# Patient Record
Sex: MALE | Race: BLACK OR AFRICAN AMERICAN
[De-identification: names, ages, dates, MRNs, and addresses within clinical notes are randomized per-mention and may not be internally consistent; named-entity substitution may affect disease eponyms.]

---

## 2020-01-08 ENCOUNTER — HOSPITAL ENCOUNTER (EMERGENCY)
Dept: HOSPITAL 62 - ER | Age: 40
Discharge: HOME | End: 2020-01-08
Payer: SELF-PAY

## 2020-01-08 VITALS — DIASTOLIC BLOOD PRESSURE: 89 MMHG | SYSTOLIC BLOOD PRESSURE: 129 MMHG

## 2020-01-08 DIAGNOSIS — S81.852A: Primary | ICD-10-CM
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
## 2020-01-08 DIAGNOSIS — J45.909: ICD-10-CM

## 2020-01-08 DIAGNOSIS — F17.210: ICD-10-CM

## 2020-01-08 PROCEDURE — 90376 RABIES IG HEAT TREATED: CPT

## 2020-01-08 PROCEDURE — 99406 BEHAV CHNG SMOKING 3-10 MIN: CPT

## 2020-01-08 PROCEDURE — 90675 RABIES VACCINE IM: CPT

## 2020-01-08 PROCEDURE — 90472 IMMUNIZATION ADMIN EACH ADD: CPT

## 2020-01-08 PROCEDURE — 90471 IMMUNIZATION ADMIN: CPT

## 2020-01-08 PROCEDURE — 90715 TDAP VACCINE 7 YRS/> IM: CPT

## 2020-01-08 PROCEDURE — 96372 THER/PROPH/DIAG INJ SC/IM: CPT

## 2020-01-08 PROCEDURE — 99283 EMERGENCY DEPT VISIT LOW MDM: CPT

## 2020-01-08 NOTE — ER DOCUMENT REPORT
ED Animal Bite





- General


Chief Complaint: Dog Bite


Stated Complaint: DOG BITE


Time Seen by Provider: 01/08/20 19:46


Primary Care Provider: 


[facility] [Provider Group] - Follow up as needed


[facility] [Provider Group] - Follow up as needed


[facility] [Provider Group] - Follow up as needed


[facility] [Provider Group] - Follow up as needed


Mode of Arrival: Ambulatory


Information source: Patient


Notes: 





39-year-old male presented to ED for complaint of dog bite to the right calf 3 

hours ago.  He states his tetanus is not up-to-date.  He states he knows the david

lives in his area but does not know his name.  He does not know the dog.  Does 

not know if the dog's immunizations are up-to-date.  He states the dog did look 

healthy.  He states he was shaken the man's hand when the dog nipped him on the 

calf.  It did break the skin.





- HPI


Location of injury: LLE


Severity of injury: Scratched


Onset: This afternoon


Quality of pain: Achy


Pain Level: 3


Severity: Moderate


Context of attack: "Unprovoked" attack, Approached animal


Type of animal: Dog


Appearance of animal: Appeared well


Animal's immunizations: Unknown


Animal control notified: Yes


Animal control form completed: Yes





- Related Data


Allergies/Adverse Reactions: 


                                        





No Known Allergies Allergy (Unverified 08/26/19 15:54)


   











Past Medical History





- General


Information source: Patient





- Social History


Smoking Status: Current Every Day Smoker


Cigarette use (# per day): Yes - One half pack a day


Chew tobacco use (# tins/day): No


Smoking Education Provided: Yes - 4 minutes


Frequency of alcohol use: Social


Drug Abuse: Marijuana


Occupation: Construction


Lives with: Homeless


Family History: None


Patient has suicidal ideation: No


Patient has homicidal ideation: No





- Past Medical History


Cardiac Medical History: Reports: None


Pulmonary Medical History: Reports: Hx Asthma


EENT Medical History: Reports: None


Neurological Medical History: Reports: None


Endocrine Medical History: Reports: None


Renal/ Medical History: Reports: None


Malignancy Medical History: Reports None


GI Medical History: Reports: None


Musculoskeletal Medical History: Reports Hx Musculoskeletal Trauma - Torn ACL 

fractured this finger on the right, torn tendons in the left fifth


Skin Medical History: Reports None


Psychiatric Medical History: Reports: None


Traumatic Medical History: Reports: None


Infectious Medical History: Reports: None


Past Surgical History: Reports: Hx Cardiac Surgery - when he was an infant





Review of Systems





- Review of Systems


Constitutional: No symptoms reported


EENT: No symptoms reported


Cardiovascular: No symptoms reported


Respiratory: No symptoms reported


Gastrointestinal: No symptoms reported


Genitourinary: No symptoms reported


Male Genitourinary: No symptoms reported


Musculoskeletal: No symptoms reported


Skin: Other - Dog bite right lower calf


Hematologic/Lymphatic: No symptoms reported


Neurological/Psychological: No symptoms reported





Physical Exam





- Vital signs


Vitals: 


                                        











Temp Pulse Resp BP Pulse Ox


 


 98.6 F   97   16   132/79 H  98 


 


 01/08/20 19:29  01/08/20 19:29  01/08/20 19:29  01/08/20 19:29  01/08/20 19:29











Interpretation: Normal





- General


General appearance: Appears well, Alert





- HEENT


Head: Normocephalic, Atraumatic


Eyes: Normal


Pupils: PERRL





- Respiratory


Respiratory status: No respiratory distress


Chest status: Nontender


Breath sounds: Normal


Chest palpation: Normal





- Cardiovascular


Rhythm: Regular


Heart sounds: Normal auscultation


Murmur: No





- Abdominal


Inspection: Normal


Distension: No distension


Bowel sounds: Normal


Tenderness: Nontender


Organomegaly: No organomegaly





- Back


Back: Normal, Nontender





- Extremities


General upper extremity: Normal inspection, Nontender, Normal color, Normal ROM,

Normal temperature


General lower extremity: Normal color, Normal ROM, Normal temperature, Normal 

weight bearing.  No: Gurpreet's sign


Calf: Tender, Laceration - Very minimal dog bite to the left lateral calf less 

than half centimeter





- Neurological


Neuro grossly intact: Yes


Cognition: Normal


Orientation: AAOx4


Chani Coma Scale Eye Opening: Spontaneous


Chani Coma Scale Verbal: Oriented


L[place]ty Coma Scale Motor: Obeys Commands


[place]ty Coma Scale Total: 15


Speech: Normal


Motor strength normal: LUE, RUE, LLE, RLE


Sensory: Normal





- Psychological


Associated symptoms: Normal affect, Normal mood





- Skin


Skin Temperature: Warm


Skin Moisture: Dry


Skin Color: Normal





Course





- Re-evaluation


Re-evalutation: 





01/08/20 21:14


Patient has elected to take the rabies shots.  He is also been treated with 

tetanus immunization and Augmentin.  He was discharged home with a prescription 

for Augmentin.  Patient has been instructed to return to the ED for nurse visits

for the rest of his rabies shots.  Patient verbalized understanding and 

agreement with treatment plan patient was discharged home.





- Vital Signs


Vital signs: 


                                        











Temp Pulse Resp BP Pulse Ox


 


 98.6 F   97   16   132/79 H  98 


 


 01/08/20 19:29  01/08/20 19:29  01/08/20 19:29  01/08/20 19:29  01/08/20 19:29














Discharge





- Discharge


Clinical Impression: 


Dog bite of calf


Qualifiers:


 Encounter type: initial encounter Laterality: left Qualified Code(s): S81.852A 

- Open bite, left lower leg, initial encounter





Condition: Stable


Disposition: HOME, SELF-CARE


Additional Instructions: 


Animal Bites





     Animal bites are often heavily contaminated with bacteria.  In spite of 

thorough cleansing and proper treatment, these wounds frequently become 

infected.  Bite wounds of the hands are especially prone to complications.


     Bites are dressed, if possible.  Large wounds may require suturing after 

internal cleansing.  Because of infection risk, some large wounds must remain 

unstitched.  Your doctor is trained to advise you on the best treatment for your

bite.


     Call the doctor at once if the wound becomes red, swollen, warm, 

increasingly painful, or if it begins to drain.  Danger signs also include red 

streaks up the involved extremity, swollen glands in the groin or under the arm,

or fever and chills.


     The risk of rabies from domestic animals is very low.  Bats, sick animals, 

and wild animals may expose you to rabies.  The physician, or the health 

department, will inform you if you will need to receive the rabies vaccine.








NON-SUTURED LACERATION:


     Your laceration did not require suturing.  Some lacerations cannot be s

utured because of increased infection risk, while others simply don't need 

stitches because they are shallow or very short.


     Your injury should be protected while it heals.  Usually complete healing 

takes 10 to 14 days.  Keep the dressing clean and dry, and change it every day.


     If you notice increasing pain, redness, swelling, drainage, or tender lumps

in the armpit or groin above the injury, infection may be present.  You should 

call the doctor at once.





SOAP CLEANSING:


     Gently wash the wound daily using a mild soap (like Ivory, Phisoderm, 

Neutrogena).  Use warm water, rubbing gently until all debris, ooze, and 

crusting have been washed from the wound.  Allow to dry briefly (about 10 

minutes) after cleaning.  Repeat this cleansing at least three times a day for 

the first two days and then once or twice a day.








ANTIBIOTIC OINTMENT PROTECTION:


     Your wounds are such that dressing them is not practical or optional.  

After cleansing, you should apply a thin coating of antibiotic ointment 

(Bacitracin, not Neosporin) to the wounds at least three times daily.  This 

lessens infection risk, and may decrease the amount of scarring.  Use a q-tip or

dull butter knife, not your finger, to apply this ointment.


     Any debris or ooze which builds up in the ointment should be gently rubbed 

off with a sterile gauze pad.  Harder crusting may need to be gently scrubbed 

off with a clean wash cloth with soap and warm water, perhaps applying a warm, 

wet wash cloth to the wound for ten minutes first.


     Development of redness, severe itching, or blistering may mean allergy to 

the ointment.  See the doctor.





Rabies Prophyllaxis





     Rabies immunization can prevent infection with the rabies virus. This virus

is always fatal if it reaches the nervous system. Exposure to an infected 

animal's saliva requires a series of shots. If you're already immunized, you may

need only a booster shot. 


     It's critical for you to follow the exact schedule of immunizations. After 

the first shot, we give repeat doses in 3 days, 7 days, 14 days, and 28 days. 

The repeat doses can also be given through the Health Department or by special 

arrangement with your doctor.


     Ibuprofen or acetaminophen can be used for aching and swelling at the 

injection site. Call the doctor or return if you develop increasing pain, fever,

chills, or spreading redness, or if you become short of breath or faint.





TETANUS IMMUNIZATION GIVEN:


     You have been given an immunization against tetanus.  Please record this in

your records.  In general, a booster is needed only once every 10 years.  The 

tetanus shot protects against tetanus or "lockjaw," which is a complication of 

certain wound infections (the tetanus shot cannot protect against the actual 

infection).


     The immunization site may become warm and red due to local reaction.  If 

this occurs, apply warm compresses and take aspirin or ibuprofen to reduce 

inflammation and discomfort.  Return for evaluation if the reaction becomes 

severe.





Augmentin





     Augmentin is a mixture of amoxicillin and clavulanate. Amoxicillin is a 

member of the penicillin family.  It covers the germs likely to cause ear, 

bronchial, and urinary infections better than plain penicillin.  The addition of

clavulanate allows it to cover staph infections of the skin, as well as 

resistant cases of ear and sinus infections.  Your physician has chosen 

Augmentin for you because of the special nature of your situation.


     Augmentin is best taken with meals.  Nausea after taking the medication is 

rare, but can occur.  Diarrhea can occur, particularly in small children.  

Vaginal yeast infections, and oral thrush in infants are also common.  Contact 

your physician if these problems occur.


     Allergy to penicillins is common.  If you have had an allergic reaction to 

any drug of the penicillin family, you should never take any other penicillin.  

Notify your doctor at once if you develop hives, shortness of breath, swelling, 

or faintness.





FOLLOW-UP CARE:


     Please return in ___3__ days for an infection check and dressing change.





     If you have been referred to another physician for follow-up care, call 

that physicians office for an appointment as you were instructed.  If you 

experience a significant change in your laceration, or if you are concerned 

there may be an infection (swelling, redness, drainage, increasing tenderness, 

red streaks, tender lumps in the armpit or groin above the laceration, or 

fever), return to the Emergency Department immediately re-evaluation.








Prescriptions: 


Amox Tr/Potassium Clavulanate [Augmentin 875-125 Tablet] 1 tab PO BID 10 Days  

tablet


Forms:  Elevated Blood Pressure, Smoking Cessation Education


Referrals: 


[facility] [Provider Group] - Follow up as needed


[facility] [Provider Group] - Follow up as needed


[facility] [Provider Group] - Follow up as needed


[facility] [Provider Group] - Follow up as needed

## 2020-01-10 ENCOUNTER — HOSPITAL ENCOUNTER (EMERGENCY)
Dept: HOSPITAL 62 - ER | Age: 40
LOS: 1 days | Discharge: HOME | End: 2020-01-11
Payer: SELF-PAY
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
## 2020-01-10 DIAGNOSIS — M25.562: Primary | ICD-10-CM

## 2020-01-10 DIAGNOSIS — F17.200: ICD-10-CM

## 2020-01-10 DIAGNOSIS — S86.811S: ICD-10-CM

## 2020-01-10 DIAGNOSIS — F12.10: ICD-10-CM

## 2020-01-10 DIAGNOSIS — Z59.0: ICD-10-CM

## 2020-01-10 PROCEDURE — 99283 EMERGENCY DEPT VISIT LOW MDM: CPT

## 2020-01-10 PROCEDURE — 73564 X-RAY EXAM KNEE 4 OR MORE: CPT

## 2020-01-10 PROCEDURE — L1830 KO IMMOB CANVAS LONG PRE OTS: HCPCS

## 2020-01-10 SDOH — ECONOMIC STABILITY - HOUSING INSECURITY: HOMELESSNESS: Z59.0

## 2020-01-11 VITALS — SYSTOLIC BLOOD PRESSURE: 135 MMHG | DIASTOLIC BLOOD PRESSURE: 68 MMHG

## 2020-01-11 NOTE — RADIOLOGY REPORT (SQ)
EXAM DESCRIPTION: 



XR KNEE 4 OR MORE VIEWS



COMPLETED DATE/TME:  01/11/2020 00:00



CLINICAL HISTORY: 



39 years, Male, bone tenderness



COMPARISON:

None.



NUMBER OF VIEWS:

4



TECHNIQUE:

4 view right knee



LIMITATIONS:

None.



FINDINGS:



The patella is superiorly located with regard to the distal

femur. There are calcifications in the region of the patellar

tendon. Degenerative changes of the patellofemoral compartment of

the knee. No acute fracture. No definitive joint effusion. Soft

tissue swelling in the region of the patellar tendon..



IMPRESSION:



Superior displacement of the patella suggesting patella nikkie,

which may reflect patellar tendon rupture. No acute fracture.

 



copyright 2011 [employer]- All Rights Reserved

## 2020-01-11 NOTE — ER DOCUMENT REPORT
ED General





- General


Chief Complaint: Knee Injury


Stated Complaint: KNEE INJURY


Time Seen by Provider: 01/11/20 01:51


Notes: 





39-year-old male presents emergency department stating that he "tapped his 

knee."  Patient states that approximately 8 to 10 years ago while playing 

basketball he thinks he may have torn his right ACL however he never had it 

imaged and had nonsurgical treatment in the form of immobilization with 

progressive weightbearing.  States that today he was walking when he tapped his 

knee into a cabinet now he has increasing pain.  States that there is no 

difference in his function and no difference in the appearance of his knee 

compared to baseline.  States he did not fall.  Denies any swelling.  States 

that his kneecap is always high riding.  Denies numbness, tingling, weakness, 

states that chronically he cannot fully extend his knee.


TRAVEL OUTSIDE OF THE [place] IN LAST 30 DAYS: No





- Related Data


Allergies/Adverse Reactions: 


                                        





No Known Allergies Allergy (Unverified 08/26/19 15:54)


   











Past Medical History





- General


Information source: Patient





- Social History


Smoking Status: Current Every Day Smoker


Frequency of alcohol use: Heavy


Drug Abuse: Marijuana


Family History: None


Patient has suicidal ideation: No


Patient has homicidal ideation: No


Pulmonary Medical History: Reports: Hx Asthma


Musculoskeletal Medical History: Reports Hx Musculoskeletal Trauma - Torn ACL 

fractured this finger on the right, torn tendons in the left fifth


Past Surgical History: Reports: Hx Cardiac Surgery - when he was an infant





Review of Systems





- Review of Systems


Musculoskeletal: See HPI


-: Yes All other systems reviewed and negative





Physical Exam





- Vital signs


Vitals: 





                                        











Temp Pulse Resp BP Pulse Ox


 


 97.9 F   83   15   138/77 H  99 


 


 01/10/20 22:06  01/10/20 22:06  01/10/20 22:06  01/10/20 22:06  01/10/20 22:06











Interpretation: Normal





- Notes


Notes: 





GENERAL: Alert, interacts well.  No acute distress.


HEAD: Normocephalic, atraumatic


EYES: Pupils equal, round and reactive to light, extraocular movements intact.


ENT: Oral mucosa moist, tongue midline. 


NECK: Full range of motion, supple, trachea midline.


LUNGS:no respiratory distress.


EXTREMITIES: Moves all 4 extremities spontaneously, no edema, radial and 

dorsalis pedis pulses 2/4 bilaterally.  No cyanosis.  Able to flex and extend 

the right leg without difficulty, negative anterior posterior drawer test, 

slight ligamentous laxity medially, no lateral ligamentous laxity, there is a 

click with flexion extension of the right knee, patella is quite high riding 

however he is able to flex his quadriceps against my hand, he is able to fully 

extend his right knee.  No evidence of complete patellar tear.  Right knee is 

nontender palpation there is no effusion.


NEUROLOGICAL: Alert and oriented x3, normal speech, patellar DTRs 2+ 

bilaterally.


PSYCH: Normal mood, normal affect.


SKIN: Warm, Dry, normal turgor, no rashes or lesions noted.





Course





- Re-evaluation


Re-evalutation: 





01/11/20 06:45





                                        





Knee X-Ray  01/11/20 00:00


IMPRESSION:


 


Superior displacement of the patella suggesting patella nikkie,


which may reflect patellar tendon rupture. No acute fracture.


 


 


copyright 2011 [employer]- All Rights Reserved


 











01/11/20 06:45


I suspect that the patient self diagnosed ACL tear was actually a partial 

patellar tendon tear 8 to 10 years ago and this is what is giving him trouble 

intermittently.  No indication for emergent MRI, no indication for emergent 

Ortho consultation seeing as he maintains full muscle strength and range of 

motion of the right knee.  Patient was placed in a knee immobilizer, given 

crutches and discharged home.  Patient agreeable with outpatient follow-up.





- Vital Signs


Vital signs: 





                                        











Temp Pulse Resp BP Pulse Ox


 


 97.9 F   83   15   138/77 H  99 


 


 01/10/20 22:06  01/10/20 22:06  01/10/20 22:06  01/10/20 22:06  01/10/20 22:06














Procedures





- Immobilization


  ** Right Knee


Pre-Proc Neuro Vasc Exam: Normal


Immobilizer type: Knee immobilizer


Performed by: PCT


Post-Proc Neuro Vasc Exam: Normal, Unchanged from pre-exam


Alignment checked and good: Yes





Discharge





- Discharge


Clinical Impression: 


Left knee pain


Qualifiers:


 Chronicity: acute Qualified Code(s): M25.562 - Pain in left knee





Patellar tendon strain


Qualifiers:


 Encounter type: sequela Laterality: right Qualified Code(s): S86.811S - Strain 

of other muscle(s) and tendon(s) at lower leg level, right leg, sequela





Condition: Stable


Disposition: HOME, SELF-CARE


Instructions:  Suspected Internal Knee Injury ([facility])


Additional Instructions: 


I suspect you tore your patellar tendon approximately 8 to 10 years ago.  I 

suspect every time that you hit it you were reinjuring this.  Orthopedic surgery

may wish to get an MRI or simply do conservative treatment including splinting 

and crutches.  Please follow-up with Dr. Hdz or the orthopedic surgeon of 

your choice as an outpatient.  Please return to the emergency department for any

new or concerning symptoms.


Referrals: 


WILBERT HDZ JR, DO [ACTIVE PROVISIONAL STAFF] - Follow up as needed